# Patient Record
Sex: FEMALE | NOT HISPANIC OR LATINO | URBAN - METROPOLITAN AREA
[De-identification: names, ages, dates, MRNs, and addresses within clinical notes are randomized per-mention and may not be internally consistent; named-entity substitution may affect disease eponyms.]

---

## 2020-09-09 ENCOUNTER — WEB ENCOUNTER (OUTPATIENT)
Dept: URBAN - METROPOLITAN AREA CLINIC 115 | Facility: CLINIC | Age: 50
End: 2020-09-09

## 2020-09-09 ENCOUNTER — OFFICE VISIT (OUTPATIENT)
Dept: URBAN - METROPOLITAN AREA CLINIC 115 | Facility: CLINIC | Age: 50
End: 2020-09-09
Payer: COMMERCIAL

## 2020-09-09 ENCOUNTER — LAB OUTSIDE AN ENCOUNTER (OUTPATIENT)
Dept: URBAN - METROPOLITAN AREA CLINIC 115 | Facility: CLINIC | Age: 50
End: 2020-09-09

## 2020-09-09 DIAGNOSIS — K62.5 RECTAL BLEEDING: ICD-10-CM

## 2020-09-09 DIAGNOSIS — Z12.11 COLON CANCER SCREENING: ICD-10-CM

## 2020-09-09 DIAGNOSIS — K64.1 GRADE II HEMORRHOIDS: ICD-10-CM

## 2020-09-09 PROBLEM — 12063002: Status: ACTIVE | Noted: 2020-09-09

## 2020-09-09 PROBLEM — 721704005: Status: ACTIVE | Noted: 2020-09-09

## 2020-09-09 PROCEDURE — 99243 OFF/OP CNSLTJ NEW/EST LOW 30: CPT | Performed by: INTERNAL MEDICINE

## 2020-09-09 PROCEDURE — 99203 OFFICE O/P NEW LOW 30 MIN: CPT | Performed by: INTERNAL MEDICINE

## 2020-09-09 RX ORDER — ATORVASTATIN CALCIUM 10 MG/1
1 TABLET TABLET, FILM COATED ORAL ONCE A DAY
Status: ACTIVE | COMMUNITY

## 2020-09-09 RX ORDER — LOSARTAN POTASSIUM 100 MG/1
1 TABLET TABLET ORAL ONCE A DAY
Status: ACTIVE | COMMUNITY

## 2020-09-09 RX ORDER — SODIUM, POTASSIUM,MAG SULFATES 17.5-3.13G
354ML SOLUTION, RECONSTITUTED, ORAL ORAL
Qty: 354 MILLILITER | Refills: 0 | OUTPATIENT
Start: 2020-09-09 | End: 2020-09-10

## 2020-09-09 RX ORDER — HYDROCHLOROTHIAZIDE 25 MG/1
1 TABLET IN THE MORNING TABLET ORAL ONCE A DAY
Status: ACTIVE | COMMUNITY

## 2020-09-09 NOTE — HPI-TODAY'S VISIT:
49 yo AF from [place] here for c/o rectal bleeding. She noticed it first, several months ago. But for the past weeks she has noted BRPR mixed in the stools. She is known to have hemorrhoids. She denies weight loss or abdominal pain. She denies family hx of colon cancer. She denies constipation but reports more frequent stools ,and feels incomplete evacuation. C/o excessive flatulance.

## 2020-09-09 NOTE — PHYSICAL EXAM GASTROINTESTINAL
Abdomen , soft, nontender, nondistended , no guarding or rigidity , no masses palpable , normal bowel sounds , Liver and Spleen , no hepatomegaly present , no hepatosplenomegaly , liver nontender , spleen not palpable , Rectal , normal sphincter tone , no rectal masses or bleeding present, internal hemorrhoids noted

## 2020-09-15 ENCOUNTER — LAB OUTSIDE AN ENCOUNTER (OUTPATIENT)
Dept: URBAN - METROPOLITAN AREA CLINIC 92 | Facility: CLINIC | Age: 50
End: 2020-09-15

## 2020-09-15 ENCOUNTER — TELEPHONE ENCOUNTER (OUTPATIENT)
Dept: URBAN - METROPOLITAN AREA CLINIC 92 | Facility: CLINIC | Age: 50
End: 2020-09-15

## 2020-09-15 ENCOUNTER — CLAIMS CREATED FROM THE CLAIM WINDOW (OUTPATIENT)
Dept: URBAN - METROPOLITAN AREA CLINIC 4 | Facility: CLINIC | Age: 50
End: 2020-09-15
Payer: COMMERCIAL

## 2020-09-15 ENCOUNTER — OFFICE VISIT (OUTPATIENT)
Dept: URBAN - METROPOLITAN AREA SURGERY CENTER 13 | Facility: SURGERY CENTER | Age: 50
End: 2020-09-15
Payer: COMMERCIAL

## 2020-09-15 DIAGNOSIS — Z12.11 COLON CANCER SCREENING: ICD-10-CM

## 2020-09-15 DIAGNOSIS — K52.89 OTHER SPECIFIED NONINFECTIVE GASTROENTERITIS AND COLITIS: ICD-10-CM

## 2020-09-15 DIAGNOSIS — K52.89 DIVERSION COLITIS: ICD-10-CM

## 2020-09-15 PROCEDURE — 88342 IMHCHEM/IMCYTCHM 1ST ANTB: CPT | Performed by: PATHOLOGY

## 2020-09-15 PROCEDURE — 88305 TISSUE EXAM BY PATHOLOGIST: CPT | Performed by: PATHOLOGY

## 2020-09-15 PROCEDURE — G8907 PT DOC NO EVENTS ON DISCHARG: HCPCS | Performed by: INTERNAL MEDICINE

## 2020-09-15 PROCEDURE — G9935 CANC NOT DETECTD DURING SRCN: HCPCS | Performed by: INTERNAL MEDICINE

## 2020-09-15 PROCEDURE — 45381 COLONOSCOPY SUBMUCOUS NJX: CPT | Performed by: INTERNAL MEDICINE

## 2020-09-15 PROCEDURE — 45380 COLONOSCOPY AND BIOPSY: CPT | Performed by: INTERNAL MEDICINE

## 2020-09-15 RX ORDER — ATORVASTATIN CALCIUM 10 MG/1
1 TABLET TABLET, FILM COATED ORAL ONCE A DAY
Status: ACTIVE | COMMUNITY

## 2020-09-15 RX ORDER — HYDROCHLOROTHIAZIDE 25 MG/1
1 TABLET IN THE MORNING TABLET ORAL ONCE A DAY
Status: ACTIVE | COMMUNITY

## 2020-09-15 RX ORDER — LOSARTAN POTASSIUM 100 MG/1
1 TABLET TABLET ORAL ONCE A DAY
Status: ACTIVE | COMMUNITY

## 2020-09-17 ENCOUNTER — TELEPHONE ENCOUNTER (OUTPATIENT)
Dept: URBAN - METROPOLITAN AREA CLINIC 92 | Facility: CLINIC | Age: 50
End: 2020-09-17

## 2020-09-17 RX ORDER — CIPROFLOXACIN HYDROCHLORIDE 500 MG/1
1 TABLET TABLET, FILM COATED ORAL
Qty: 20 | OUTPATIENT
Start: 2020-09-17 | End: 2020-09-27

## 2020-09-17 RX ORDER — METRONIDAZOLE 500 MG/1
1 TABLET TABLET, FILM COATED ORAL THREE TIMES A DAY
Qty: 30 TABLET | Refills: 0 | OUTPATIENT
Start: 2020-09-17 | End: 2020-09-27

## 2020-09-21 ENCOUNTER — LAB OUTSIDE AN ENCOUNTER (OUTPATIENT)
Dept: URBAN - METROPOLITAN AREA CLINIC 82 | Facility: CLINIC | Age: 50
End: 2020-09-21

## 2020-09-24 ENCOUNTER — TELEPHONE ENCOUNTER (OUTPATIENT)
Dept: URBAN - METROPOLITAN AREA CLINIC 82 | Facility: CLINIC | Age: 50
End: 2020-09-24

## 2020-09-30 ENCOUNTER — DASHBOARD ENCOUNTERS (OUTPATIENT)
Age: 50
End: 2020-09-30

## 2020-10-01 ENCOUNTER — OFFICE VISIT (OUTPATIENT)
Dept: URBAN - METROPOLITAN AREA TELEHEALTH 2 | Facility: TELEHEALTH | Age: 50
End: 2020-10-01
Payer: COMMERCIAL

## 2020-10-01 ENCOUNTER — TELEPHONE ENCOUNTER (OUTPATIENT)
Dept: URBAN - METROPOLITAN AREA CLINIC 92 | Facility: CLINIC | Age: 50
End: 2020-10-01

## 2020-10-01 DIAGNOSIS — K63.3 COLON ULCER: ICD-10-CM

## 2020-10-01 DIAGNOSIS — K64.4 EXTERNAL BLEEDING HEMORRHOIDS: ICD-10-CM

## 2020-10-01 DIAGNOSIS — K63.89 COLONIC MASS: ICD-10-CM

## 2020-10-01 DIAGNOSIS — K92.1 BLACK STOOL: ICD-10-CM

## 2020-10-01 DIAGNOSIS — K64.8 INTERNAL BLEEDING HEMORRHOIDS: ICD-10-CM

## 2020-10-01 PROBLEM — 425711007: Status: ACTIVE | Noted: 2020-10-01

## 2020-10-01 PROBLEM — 75884004: Status: ACTIVE | Noted: 2020-10-01

## 2020-10-01 PROBLEM — 26421009: Status: ACTIVE | Noted: 2020-10-01

## 2020-10-01 PROBLEM — 46040000: Status: ACTIVE | Noted: 2020-10-01

## 2020-10-01 PROCEDURE — G8417 CALC BMI ABV UP PARAM F/U: HCPCS | Performed by: INTERNAL MEDICINE

## 2020-10-01 PROCEDURE — G9903 PT SCRN TBCO ID AS NON USER: HCPCS | Performed by: INTERNAL MEDICINE

## 2020-10-01 PROCEDURE — G8484 FLU IMMUNIZE NO ADMIN: HCPCS | Performed by: INTERNAL MEDICINE

## 2020-10-01 PROCEDURE — 99214 OFFICE O/P EST MOD 30 MIN: CPT | Performed by: INTERNAL MEDICINE

## 2020-10-01 PROCEDURE — 1036F TOBACCO NON-USER: CPT | Performed by: INTERNAL MEDICINE

## 2020-10-01 PROCEDURE — G8427 DOCREV CUR MEDS BY ELIG CLIN: HCPCS | Performed by: INTERNAL MEDICINE

## 2020-10-01 RX ORDER — ATORVASTATIN CALCIUM 10 MG/1
1 TABLET TABLET, FILM COATED ORAL ONCE A DAY
Status: ACTIVE | COMMUNITY

## 2020-10-01 RX ORDER — HYDROCHLOROTHIAZIDE 25 MG/1
1 TABLET IN THE MORNING TABLET ORAL ONCE A DAY
Status: ACTIVE | COMMUNITY

## 2020-10-01 RX ORDER — MESALAMINE 375 MG/1
TAKE 4 CAPSULES (1,500 MG) BY ORAL ROUTE ONCE DAILY IN THE MORNING FOR 90 DAYS CAPSULE, EXTENDED RELEASE ORAL 1
Qty: 360 CAPSULE | Refills: 3 | OUTPATIENT
Start: 2020-10-01 | End: 2021-09-26

## 2020-10-01 RX ORDER — LOSARTAN POTASSIUM 100 MG/1
1 TABLET TABLET ORAL ONCE A DAY
Status: ACTIVE | COMMUNITY

## 2020-10-01 NOTE — HPI-TODAY'S VISIT:
Ms. Cates is a 50-year-old -American female who was originally seen me for some bright red blood per rectum/screening colonoscopy evaluation.  Colonoscopy revealed an ulcerated submucosal mass lesion noted in the splenic flexure region.  This was concerning for the malignancy however biopsies were negative for malignancy.  Deeper layers were also checked by the pathologist and he felt it was more granulation tissue more suggestive of inflammatory bowel disease.  Patient did not have any any evidence of Crohn's disease elsewhere. Patient denied having diarrhea prior to this colonoscopy showed however she has had occasional bright red per rectum.  Colonoscopy also revealed moderate size internal and external hemorrhoids.   Given the benign pathology, I have treated her with Cipro and Flagyl for 10 days.  Patient stated she had Flagyl caused her nausea vomiting also she had some diarrhea during that she has noticed having more fresh blood per rectum.  Denies any weight loss.  She has occasional abdominal cramping otherwise denies any left upper quadrant pain. She has not had a prior history of diverticulitis.  She was noted to have diverticulosis during colonoscopy.  She denies fever chills or Reiger.  She CT scan of the abdomen and pelvis done which was negative for any other malignancy or any other concerns for intra-abdominal pathology.

## 2020-10-06 ENCOUNTER — WEB ENCOUNTER (OUTPATIENT)
Dept: URBAN - METROPOLITAN AREA CLINIC 82 | Facility: CLINIC | Age: 50
End: 2020-10-06

## 2020-10-15 ENCOUNTER — TELEPHONE ENCOUNTER (OUTPATIENT)
Dept: URBAN - METROPOLITAN AREA CLINIC 23 | Facility: CLINIC | Age: 50
End: 2020-10-15

## 2022-04-27 NOTE — PHYSICAL EXAM NEUROLOGIC:
[facility] [place] [address] [address], [place]: [phone]  F: [phone]    Reason for Visit:   Silver Tello is a [de-identified] y.o. female who is seen in consultation at the request of Dr. Joaquim Peterson for evaluation of breast cancer. Treatment History:   Breast biopsy 2/22  LEFT mastectomy 3/22      STAGE: PATHOLOGIC STAGE CLASSIFICATION (pTNM, AJCC 8th Edition)       TNM Descriptors: m (multiple foci of invasive carcinoma), r   (recurrent)       Primary Tumor (pT): pT2       Regional Lymph Nodes Modifier: (sn): [place] node(s) evaluated       Regional Lymph Nodes (pN): pN0    History of Present Illness:     Pt seen today for office consult for new  LEFT breast cancer ER+ HER2 negative post mastectomy 3/22. Here today to discuss adjuvant hormonal therapy. .   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 Had biopsy. 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 FL 97%, Her 2 2+, Ki 10% FISH negative  3/28/22 - LEFT breast mastectomy, sln biopsy T2 N0 multicentric   Did well with surgery. Not interested in chemo. Here today to discuss adjuvant hormonal therapy. Has arthritis. Healthy overall. Has not seen PCP lately. Feels well today. Here alone. No fevers/ chills/ chest pain/ SOB/ nausea/ vomiting/diarrhea/ pain/fatigu    Past Medical History:   Diagnosis Date    Arthritis     back, neck, hands    Breast cancer ([facility] [address] 1997    Left    Breast cancer ([facility] [address] 02/15/2022    Left Mucinous Carcinoma    Chronic pain     fibromyalgia - neck/hands    Fibromyalgia     History of breast cancer 1997    Left breast cancer treated with surgery and radiation.  Hyperlipemia     Hypertension     Other ill-defined conditions(799.89)     IBD - no problem with this at this time    S/P radiation therapy 1997    Vaginal delivery     Four times. Four episiotomies.       Past Surgical History:   Procedure Laterality Date    COLONOSCOPY N/A oriented to person, place and time , normal sensation , short and long term memory intact 6/15/2016    COLONOSCOPY performed by Indira Estrella MD at [address] COLONOSCOPY N/A 3/8/2022    COLONOSCOPY   :- performed by Cece Marino MD at [address] HX APPENDECTOMY      HX BREAST BIOPSY Left     stereo  benign    HX BREAST BIOPSY Left 02/15/2022    US guided Mucinous Carcinoma    HX BREAST LUMPECTOMY Left     Ca.  HX GI      colonoscopies - last     HX GYN      hysterectomy    HX HEENT      childhood tonsillectomy    HX MASTECTOMY Left 3/28/2022    LEFT BREAST SIMPLE MASTECTOMY (INJECTION OF MAGTRACE IN PREOP) performed by Megan Ybarra MD at [address] HX OTHER SURGICAL      Hemorrhoidectomy performed in a doctor's office circa .  HX TUBAL LIGATION      IL BREAST SURGERY PROCEDURE UNLISTED Left     L lumpectomy x2    VASCULAR SURGERY PROCEDURE UNLIST      vein sgy on L leg      Social History     Tobacco Use    Smoking status: Former Smoker     Packs/day: 0.25     Quit date: 11/3/1984     Years since quittin.5    Smokeless tobacco: Never Used   Substance Use Topics    Alcohol use: Yes     Alcohol/week: 5.0 standard drinks     Types: 3 Standard drinks or equivalent, 2 Shots of liquor per week      Family History   Problem Relation Age of Onset    Heart Disease Mother     Hypertension Mother     Colon Cancer Father     Heart Disease Brother         mi x2    Heart Attack Brother     Other Brother          IN [place]    Anesth Problems Neg Hx      Current Outpatient Medications   Medication Sig    anastrozole (ARIMIDEX) 1 mg tablet Take 1 mg by mouth daily. Indications: hormone receptor positive breast cancer    gabapentin (NEURONTIN) 100 mg capsule Take 1 Capsule by mouth two (2) times a day. Max Daily Amount: 200 mg.  cholecalciferol (Vitamin D3) 25 mcg (1,000 unit) cap Take  by mouth daily.  Biotin 2,500 mcg cap Take  by mouth.     GLUC/CHND/OM3/DHA/EPA/FISH/STR (GLUCOSAMINE CHONDROITIN PLUS PO) Take 1,500 mg by mouth daily.  spironolactone (ALDACTONE) 50 mg tablet Take 50 mg by mouth daily.  MULTIVITAMIN PO Take  by mouth. Takes one po once daily.  atorvastatin (LIPITOR) 10 mg tablet Take 10 mg by mouth every morning. Indications: high cholesterol     No current facility-administered medications for this visit. Allergies   Allergen Reactions    Augmentin [Amoxicillin-Pot Clavulanate] Diarrhea    Indocin [Indomethacin Sodium] Nausea and Vomiting    Tolectin 600 Swelling        A complete review of systems was obtained, negative except as described above and as reported on ROS sheet scanned into system. Physical Exam:     Visit Vitals  /84 (BP 1 Location: Left upper arm, BP Patient Position: Sitting)   Pulse (!) 102   Temp (!) 96.6 °F (35.9 °C) (Temporal)   Ht 5' 3\" (1.6 m)   Wt 114 lb 3.2 oz (51.8 kg)   SpO2 98%   BMI 20.23 kg/m²     ECOG PS: 0 looks younger than stated age  General: No distress  Eyes: PERRLA, anicteric sclerae  HENT: Atraumatic, wearing mask  Neck: Supple  Respiratory: CTAB, normal respiratory effort  CV: Normal rate, regular rhythm, no murmurs, no peripheral edema  GI: Soft, nontender, nondistended  MS: Normal gait and station. Digits without clubbing or cyanosis. Skin: No rashes, ecchymoses, or petechiae. Normal temperature, turgor, and texture. Psych: Alert, oriented, appropriate affect, normal judgment/insight  Neuro non focal  Breast LEFT mastectomy scar healed well, no masses on RIGHT    Results:     Lab Results   Component Value Date/Time    WBC 6.8 03/23/2022 02:50 PM    HGB 13.4 03/23/2022 02:50 PM    HCT 41.1 03/23/2022 02:50 PM    PLATELET 937 42/03/1566 02:50 PM    MCV 96.9 03/23/2022 02:50 PM    ABS.  NEUTROPHILS 4.8 03/23/2022 02:50 PM     Lab Results   Component Value Date/Time    Sodium 140 03/23/2022 02:50 PM    Potassium 4.3 03/23/2022 02:50 PM    Chloride 109 (H) 03/23/2022 02:50 PM    CO2 29 03/23/2022 02:50 PM    Glucose 109 (H) 03/23/2022 02:50 PM    BUN 13 03/23/2022 02:50 PM    Creatinine 0.84 03/23/2022 02:50 PM    GFR est AA >60 03/23/2022 02:50 PM    GFR est non-AA >60 03/23/2022 02:50 PM    Calcium 10.5 (H) 03/23/2022 02:50 PM     No results found for: TBILI, ALT, AP, TP, ALB, GLOB      Records reviewed and summarized above. Pathology report(s) reviewed above. Radiology report(s) reviewed above. Assessment:/PLAN     1)  Stage IA (pT2, pN0(sn), cM0, G2, ER+, HI+, HER2-  PATHOLOGIC STAGE CLASSIFICATION (pTNM, AJCC 8th Edition)       TNM Descriptors: m (multiple foci of invasive carcinoma), r   (recurrent)       Primary Tumor (pT): pT2       Regional Lymph Nodes Modifier: (sn): [place] node(s) evaluated       Regional Lymph Nodes (pN): pN0    Post mastectomy 3/28/22  Records and history reviewed with pt today. Reviewed path and data specifically with pt. Discussed dx, stage and treatment of breast cancer. Reviewed prior hx of breast DCIS 1997 with Dr Alfredo Mendoza. No hormonal therapy done then. Pt is not interested in chemo. Not seeing Rad/onc as hx of radiation. Open to trying adjuvant hormonal therapy. Reviewed AIs vs tamxoifen. The risks and benefits of aromatase inhibitors (anastrozole, letrozole, and exemestane) were discussed in detail and the patient was informed of the following: Risks include the development of painful muscles and joints (arthralgia/myalgia) and bone loss. Muscle and joint pain can be severe but rarely result in any tissue damage; symptoms usually resolve in several weeks when the medication is stopped. Bone loss is common and a bone density test is recommended as a baseline and then yearly to every several years depending on initial results. The risk of fractures is increased by a few percent in patients taking these drugs, but careful monitoring of bone density and using bone protecting agents when indicated can minimize these risks.  Unlike tamoxifen there is no increased risk of blood clots or endometrial cancer. AIs can cause or worsen vaginal dryness but women using these drugs should not use vaginal estrogen preparations for these symptoms. AIs can also cause or increase hot flashes. Any other symptoms should be reported. Pt is willing to try anastrozole. Printed script for pt today. Pt is healthy overall with no medical problems. Will let us know when she starts AI. Will fu with PCP for labs and routine HM. Fu in 3 months    2) arthritis. Per PCP. 3) Psychosocial.  Mood good. Coping well.  and likes to garden. Has good support. Fu here in 3 months  Call if questions    I appreciate the opportunity to participate in Ms. Ilya Amaro's care.     Signed By: Philip Waldron, DO